# Patient Record
Sex: MALE | Race: WHITE | URBAN - METROPOLITAN AREA
[De-identification: names, ages, dates, MRNs, and addresses within clinical notes are randomized per-mention and may not be internally consistent; named-entity substitution may affect disease eponyms.]

---

## 2024-10-18 ENCOUNTER — HOSPITAL ENCOUNTER (OUTPATIENT)
Dept: CT IMAGING | Age: 74
Discharge: HOME | End: 2024-10-18
Attending: INTERNAL MEDICINE

## 2024-10-18 DIAGNOSIS — Z13.6 SCREENING FOR HEART DISEASE: ICD-10-CM

## 2024-10-18 LAB
POCT GLUCOSE CHOLESTECH: 109 (ref 70–140)
POCT HDL: 64 (ref 45–65)
POCT LDL: 113 (ref 0–99)
POCT TOTAL CHOLESTEROL: 205 (ref 110–200)
POCT TRIGLYCERIDES: 146 (ref 1–149)

## 2024-10-18 NOTE — PROGRESS NOTES
Date of Service 10/18/2024    KAREN CIFUENTES  Date of Birth 6/21/1950    Patient Age: 74 year old    PCP: Alma Bashir MD  [id number] [address]    Heart Scan Consult  Preliminary Heart Scan Score: 657    Previous Screening  Heart Scan Completed Previously: No                   Risk Factors  Personal Risk Factors  Alterable Risk Factors: High Blood Pressure;Diabetes;Abnormal Cholesterol      Body Mass Index  BMI 27    Blood Pressure  /66 on med.  (Normal =< 120/80,  Elevated = 120-129/ >80,  High Stage1 130-139/80-89 , Stage2 >140/>90)    Lipid Profile  Patient was in fasting state: No    Cholesterol: 205, done on 10/18/2024.  HDL Cholesterol: 64, done on 10/18/2024.  LDL Cholesterol: 113, done on 10/18/2024.  TriGlycerides 146, done on 10/18/2024.  He is not on medication.  Due to higher calcium score on the heart scan today and your carotid arteries have plaque build up - your LDL goal will be to have below 70.  Discuss this with your doctor.    Cholesterol Goals  Value   Total  =< 200   HDL  = > 45 Men = > 55 Women   LDL   =< 100   Triglycerides  =< 150       Glucose and Hemoglobin A1C  Lab Results   Component Value Date    PGLU 109 10/18/2024     (Normal Fasting Glucose < 100mg/dl )    Nurse Review  Risk factor information and results reviewed with Nurse: Yes    Recommended Follow Up:  Consult your physician regarding:: Final Heart Scan Report;Discuss potential for Incidental Finding      Recommendations for Change:  Nutrition Changes: Low Saturated Fat    Cholesterol Modification (goal of therapy depends upon your risk): Decrease LDL (Lousy/Bad) Ideal <100    Exercise: Enhance Current Program    Smoking Cessation: > 1 Year Ago    Weight Management: Decrease Current Weight    Stress Management: Adopt Stress Management Techniques    Repeat Heart Scan: 3 Years if Calcium Score is > 0.0;Discuss with your Physician              [place] Recommended  Resources:  Recommended Resources: Upcoming Classes, Medical Services and Health Library [email or web address]            Emma MURILLO RN        Please Contact the Nurse Heart Line with any Questions or Concerns [phone].